# Patient Record
(demographics unavailable — no encounter records)

---

## 2018-01-03 NOTE — RAD
Chest, 2 views, 1/3/2018:



History: Dyspnea



The heart size and pulmonary vascularity are normal. There appears to be

minimal atelectasis along the dome of the left hemidiaphragm. No pulmonary

consolidation is seen. There is no evidence of pleural fluid. Moderate

spurring is present in the spine.



IMPRESSION: Minimal left basilar atelectasis.

## 2018-12-05 NOTE — RAD
DATE: 12/4/2018



EXAM: MAMMO KANG SCREENING BILATERAL



HISTORY: Routine screening



COMPARISON: 11/29/2017



This study was interpreted with the benefit of Computerized Aided Detection

(CAD).





Breast Density: HETERO The breast parenchyma is heterogenously dense, which

could reduce sensitivity of mammography. Breast parenchyma level C.





FINDINGS: The breasts are heterogeneously dense in a multinodular pattern. 

There are numerous smooth nodules in both breasts.  Bilateral smooth nodule

such as this are most commonly benign.  No spiculated mass or architectural

distortion is evident.  No enlarging breast densities are seen.  Benign type

calcifications are present.  No suspicious microcalcifications have developed.





IMPRESSION: Stable mammograms without evidence of malignancy.





BI-RADS CATEGORY: 2 BENIGN FINDING(S)



RECOMMENDED FOLLOW-UP: 12M 12 MONTH FOLLOW-UP



PQRS compliance statement: Patient information was entered into a reminder

system with a target due date     for the next mammogram.



Mammography is a sensitive method for finding small breast cancers, but it

does not detect them all and is not a substitute for careful clinical

examination.  A negative mammogram does not negate a clinically suspicious

finding and should not result in delay in biopsying a clinically suspicious

abnormality.



"Our facility is accredited by the American [employer] Mammography

Program."

## 2018-12-07 NOTE — RAD
Bone densitometry scan, 12/6/2018:

 

HISTORY: Osteopenia, probably a therapy

 

The lumbar spine and right hip were examined utilizing a DEXA technique. 

The bone mineral density in the lumbar spine as measured from the L1-L4 

levels is 1.20 g/sq cm yielding a T score of 1.4. This yields a T score of

0.2 which is in the normal range. This represents a significant 

improvement since the 8/18/2015 study at which time the T score was -1.2.

 

The total T score at the right hip is -1.2 compatible with osteopenia.

 

IMPRESSION:

1. Improving bone mineral density in the lumbar spine which is now in the 

normal range.

2. Minimal osteopenia at the right hip.

 

Electronically signed by: Rick Moritz, MD (12/7/2018 8:11 AM) [facility]

## 2019-06-25 NOTE — CARD
MR#: [id number]

Account#: [id number]

Accession#: [id number]

Date of Study: 06/25/2019

Ordering Physician: EDDIE DURHAM, 

Referring Physician: EDDIE DURHAM, 

Tech: Courtney Chowdary





--------------- APPROVED REPORT --------------





EXAM: Two-dimensional and M-mode echocardiogram with Doppler and color Doppler.



Other Information 

Quality : AverageHR: 70bpm

Rhythm : NSR



INDICATION

Venus Insufficiency



RISK FACTORS

Hypertension 

Hyperlipidemia



2D DIMENSIONS 

Left Atrium(2D)3.6 (1.6-4.0cm)IVSd1.1 (0.7-1.1cm)

Aortic Root(2D)3.2 (2.0-3.7cm)LVDd5.7 (3.9-5.9cm)

LVOT Diameter2.1 (1.8-2.4cm)PWd1.0 (0.7-1.1cm)

LVDs4.7 (2.5-4.0cm)FS (%) 21.6 %

SV77.8 ml



Aortic Valve

AoV Peak Kevin.116.4cm/sAoV VTI26.0cm

AO Peak GR.5.4mmHgLVOT Peak Kevin.112.7cm/s

LVOT  VTI 22.48cmAO Mean GR.4mmHg

NATHANIEL (VMAX)3.80tj5LQD   (VTI)3.03cm2



Mitral Valve

MV E Bypprpnd70.4cm/sMV DECEL [id number]

MV A Vrrglhji45.9cm/sE/A  Ratio1.4



Pulmonary Valve

PV Peak Nnesxvap36.7cm/sPV Peak Grad.4mmHg



Tricuspid Valve

RAP TRQMWRLW3gzToJI Peak Gr.18mmHg

IWQL73wjGm



Pulmonary Vein

S1 Rxagnoui94.8cm/sD2 Rlpydqmb31.4cm/s



 LEFT VENTRICLE 

The Left Ventricle is borderline dilated. There is borderline concentric left ventricular hypertrophy
. The systolic function is mildly decreased. The Ejection Fraction is 40-45%. There is mild global hy
pokinesis of the left ventricle. Transmitral Doppler flow pattern is Grade II-pseudonormal filling dy
namics.



 RIGHT VENTRICLE 

The right ventricle is normal size. There is normal right ventricular wall thickness. The right ventr
icular systolic function is normal.



 ATRIA 

The left atrium size is normal. The right atrium size is normal. The interatrial septum is intact wit
h no evidence for an atrial septal defect or patent foramen ovale as noted on 2-D or Doppler imaging.




 AORTIC VALVE 

The aortic valve is normal in structure and function. Doppler and Color Flow revealed no significant 
aortic regurgitation. There is no significant aortic valvular stenosis.



 MITRAL VALVE 

The mitral valve is normal in structure and function. There is no evidence of mitral valve prolapse. 
There is no mitral valve stenosis. Doppler and Color-flow revealed moderate mitral regurgitation.



 TRICUSPID VALVE 

The tricuspid valve is normal in structure and function. Doppler and Color Flow revealed no tricuspid
 valve regurgitation noted. There is no tricuspid valve prolapse or vegetation. There is no tricuspid
 valve stenosis.



 PULMONIC VALVE 

The pulmonic valve is not well visualized. Doppler and Color Flow revealed no pulmonic valvular regur
gitation.



 GREAT VESSELS 

The aortic root is normal in size. The IVC is normal in size and collapses >50% with inspiration.



 PERICARDIAL EFFUSION 

There is no evidence of significant pericardial effusion.



Critical Notification

Critical Value: No



<Conclusion>

The Left Ventricle is borderline dilated.

The systolic function is mildly decreased.

The Ejection Fraction is 40-45%.

There is mild global hypokinesis of the left ventricle.

There is borderline concentric left ventricular hypertrophy.

There is no significant aortic valvular stenosis.

Doppler and Color Flow revealed no significant aortic regurgitation.

Doppler and Color-flow revealed  moderate mitral regurgitation.

Doppler and Color Flow revealed no tricuspid valve regurgitation noted.



Signed by : Oswaldo Ervin MD

Electronically Approved : 06/25/2019 16:24:22

## 2019-07-10 NOTE — RAD
MR#: [id number]

Account#: [id number]

Accession#: [id number]

Date of Study: 07/09/2019

Ordering Physician: EDDIE DURHAM, 

Referring Physician: EDDIE DURHAM, 

Tech: Stella Lloyd RDMS, RVT, RTR





--------------- APPROVED REPORT --------------





Patient Location : OUT-PATIENT



Indications

Lower Extremity Pain : 

Grayscale images of the bilateral lower extremity greater saphenous veins demonstrate noncompressibil
ity suggestive of prior ablation. Cannot rule out chronic thrombus. No obvious reflux is noted at the
 saphenofemoral junctions. No obvious anterior or posterior accessory saphenous veins visualized. No 
perforators visualized.



Critical Notification

Critical Value: No



<Conclusion>

1. Probable bilateral prior greater saphenous vein ablations versus chronic thrombus

2. No reflux identified bilaterally in the greater and lesser saphenous veins.



Signed by : Anthony Holland, 

Electronically Approved : 07/10/2019 09:08:55

## 2019-09-05 NOTE — RAD
MR#: [id number]

Account#: [id number]

Accession#: [id number]

Date of Study: 09/05/2019

Ordering Physician: EDDIE CARRION 

Referring Physician: JORDAN SERRA Tech: MARLYN Sifuentes





--------------- APPROVED REPORT --------------





Test Type:          Pharmacological

Stress Nurse/Tech: MARLYN Sifuentes

Test Indications: Leg swelling

Cardiac History: none

Medications:     see EHR

Medical History: see EHR

Resting ECG:     SR

Resting Heart Rate: 54 bpm

Resting Blood Pressure: 147/81mmHg

Pretest Chest Pain: None



Nurse/Tech Notes

Consent: The procedure was explained to the patient in lay terms. Informed consent was witnessed. Cole
eout was entered into [employer]. History and Stress Test performed by MARLYN Sifuentes



Pharm. Details

Pharmacologic stress testing was performed using 0.4mg per 5ml of regadenoson given intravenously ove
r 7-10 seconds.



POST EXERCISE

Reason for Termination: Infusion complete

Max HR: 111 bpm

Max Blood Pressure: 137/71mmHg

Blood Pressure response to exercise: Normal blood pressure response during stress.

Chest Pain: No. 



INTERPRETATION

Stress EKG Conclusion: Baseline EKG showed sinus rhythm.  Non-diagnostic changes at peak stress.  No 
arrhythmias.



Imaging Protocol

IMAGE PROTOCOL: Rest Tc-99m/stress Tc-99m 1 day



Rest:            Stress:         Viability:   

Radiopharm.Tc99m MzomlkomrKc28r Sestamibi

Mlhp10aYr            33mCi            

Duration    15min.           10min.           

Img Date  09/05/2019 09/05/2019      

Inj-Img Matd48ylo.           60min.           



Rest Admin Site:IV - Right WristAdministrator: MARLYN Sifuentes

Stress Admin Site: IV - Right WristAdministrator: MARLYN Sifuentes



STRESS DATA

End Diast. Vol.111.0mlAv. Heart Rate75.0bpm

End Syst. Vol.49.0mlCO Index BSA0.0L/min

Myocardial Fpab457.0gEject. Qozmslss61.0%



Stress Rates

Pk. Fill Rate2.42EDV/secLVtime Pk. Fill 228.82msec

Pk. Empty Rate3.41ESV/secLVtime Pk. Fuirs402.37msec

1/3 Pk. Fill0.67EDV/sec



Stress Scores

Regional WT1.00Summed WT8.00

Regional WM0.00Summed WM17.00



Study quality was good.  Left Ventricular size was Normal at Rest and Stress.

Lung uptake was .  Left Ventricular ejection fraction is 56%.

The rest and stress images show normal perfusion, normal contraction and thickening.



LV Perf. Quant

17 Seg. SSS1.00

17 Seg. SRS2.00

17 Seg. SDS0.00

Stress Defect Extent (% LAD)0.00Rest Defect Extent (% LAD)0.00Rev. Defect Extent (% LAD)0.00

Stress Defect Extent (% LCX) 5.00Rest Defect Extent (% LCX)17.50Rev. Defect Extent (% LCX)0.00

Stress Defect Extent (% RCA)0.00Rest Defect Extent (% RCA)0.00Rev. Defect Extent (% RCA)0.00

Stress Defect Extent (% TJ)0.90Rest Defect Extent (% TJ)3.00Rev. Defect Extent (% TJ)0.00



Conclusion

1. Regadenoson cardioisotope stress test did not show any evidence of ischemia or infarct.

2. Normal left ventricular systolic function with ejection fraction calculated at 56%.

3. Low risk for cardiac events.



Signed by : Eddie Carrion, 

Electronically Approved : 09/05/2019 16:26:55

## 2019-12-06 NOTE — RAD
DATE: December 5, 2019



EXAM: MAMMO KANG SCREENING BILATERAL



HISTORY: Screening study.



COMPARISON: 2017 and 2018



This study was interpreted with the benefit of Computerized Aided Detection

(CAD).



2-D digital mammographic views of both breasts were performed in the CC and

MLO projections. 3-D digital tomosynthesis images of both breasts were

performed in the CC and MLO projections and reviewed on a computer

workstation.



FINDINGS:



Breast Density: HETERO The breast parenchyma is heterogenously dense, which

could reduce sensitivity of mammography. Breast parenchyma level C.. Bilateral

breast nodularity is stable. There are no new dominant suspicious masses,

suspicious microcalcifications or evidence of architectural distortion.





IMPRESSION: No mammographic indicators for malignancy.







BI-RADS CATEGORY: 2 BENIGN FINDING



RECOMMENDED FOLLOW-UP: 12M 12 MONTH FOLLOW-UP



PQRS compliance statement: Patient information was entered into a reminder

system with a target due date December 6, 2020 for the next mammogram.



Mammography is a sensitive method for finding small breast cancers, but it

does not detect them all and is not a substitute for careful clinical

examination.  A negative mammogram does not negate a clinically suspicious

finding and should not result in delay in biopsying a clinically suspicious

abnormality.



"Our facility is accredited by the American [employer] Mammography

Program."



The patient's breast density may affect the ability of mammography to detect

breast cancer. There are 4 categories of breast density, A, B, C and D. Breast

density A means that most of the breast tissue is replaced with adipose tissue

and therefore is not dense. Breast density B means that the breast tissue is

mildly dense and scattered. Breast density C means that the breast tissue is

heterogeneously dense. Breast density D means that the breast tissue is very

dense. Breast densities especially C and D may decrease the sensitivity of

mammography to detect breast cancer. Therefore, the patient may benefit from

3-D breast mammography (3D breast tomography) as a part of their screening

mammogram. Insurance may or may not pay for this additional imaging. The

patient's breast density based on today's mammogram is category C.

## 2020-03-09 NOTE — RAD
EXAM: Pelvis and right hip, 2 views.

 

HISTORY: Pain.

 

COMPARISON: None.

 

FINDINGS: A frontal view of the pelvis and frog-leg view the right hip are

obtained. There is no fracture, dislocation or subluxation. There is 

osteitis pubis. There is degenerative facet arthropathy at the lumbosacral

junction.

 

IMPRESSION: No acute osseous finding. Osteitis pubis.

 

Electronically signed by: Nita Jerry MD (3/9/2020 5:24 PM) [id number]